# Patient Record
Sex: FEMALE | Race: BLACK OR AFRICAN AMERICAN | URBAN - METROPOLITAN AREA
[De-identification: names, ages, dates, MRNs, and addresses within clinical notes are randomized per-mention and may not be internally consistent; named-entity substitution may affect disease eponyms.]

---

## 2017-04-28 ENCOUNTER — OFFICE VISIT (OUTPATIENT)
Dept: FAMILY MEDICINE CLINIC | Age: 47
End: 2017-04-28

## 2017-04-28 VITALS
OXYGEN SATURATION: 98 % | HEIGHT: 68 IN | SYSTOLIC BLOOD PRESSURE: 118 MMHG | DIASTOLIC BLOOD PRESSURE: 82 MMHG | RESPIRATION RATE: 18 BRPM | BODY MASS INDEX: 27.13 KG/M2 | HEART RATE: 89 BPM | TEMPERATURE: 98 F | WEIGHT: 179 LBS

## 2017-04-28 DIAGNOSIS — R31.9 HEMATURIA: ICD-10-CM

## 2017-04-28 DIAGNOSIS — K59.09 CHRONIC CONSTIPATION: ICD-10-CM

## 2017-04-28 DIAGNOSIS — K43.9 VENTRAL HERNIA WITHOUT OBSTRUCTION OR GANGRENE: ICD-10-CM

## 2017-04-28 DIAGNOSIS — E55.9 VITAMIN D DEFICIENCY: ICD-10-CM

## 2017-04-28 DIAGNOSIS — E78.00 PURE HYPERCHOLESTEROLEMIA: Primary | ICD-10-CM

## 2017-04-28 LAB
25(OH)D3 SERPL-MCNC: 19.8 NG/ML (ref 32–100)
BILIRUB UR QL: NEGATIVE
CHOLEST SERPL-MCNC: 206 MG/DL (ref 110–200)
EPITHELIAL,EPSU: ABNORMAL /HPF (ref 0–2)
GLUCOSE UR QL: NEGATIVE MG/DL
HDLC SERPL-MCNC: 48 MG/DL (ref 40–59)
HGB UR QL STRIP: NEGATIVE
KETONES UR QL STRIP.AUTO: NEGATIVE MG/DL
LDLC SERPL CALC-MCNC: 140 MG/DL (ref 50–99)
LEUKOCYTE ESTERASE: ABNORMAL
NITRITE UR QL STRIP.AUTO: NEGATIVE
PH UR STRIP: 5 PH (ref 5–8)
PROT UR QL STRIP: NEGATIVE MG/DL
RBC #/AREA URNS HPF: ABNORMAL /HPF
SP GR UR: 1.01 (ref 1–1.03)
TRIGL SERPL-MCNC: 88 MG/DL (ref 40–149)
UROBILINOGEN UR STRIP-MCNC: <2 MG/DL
VLDLC SERPL CALC-MCNC: 18 MG/DL (ref 8–30)
WBC URNS QL MICRO: ABNORMAL /HPF (ref 0–2)

## 2017-04-28 RX ORDER — LUBIPROSTONE 8 UG/1
8 CAPSULE, GELATIN COATED ORAL 2 TIMES DAILY WITH MEALS
Qty: 60 CAP | Refills: 0 | Status: SHIPPED | OUTPATIENT
Start: 2017-04-28 | End: 2017-04-28 | Stop reason: SDUPTHER

## 2017-04-28 RX ORDER — LUBIPROSTONE 8 UG/1
8 CAPSULE, GELATIN COATED ORAL 2 TIMES DAILY WITH MEALS
Qty: 60 CAP | Refills: 0 | Status: SHIPPED | OUTPATIENT
Start: 2017-04-28

## 2017-04-28 NOTE — PROGRESS NOTES
Assessment/Plan:    *Manuela was seen today for abdominal pain. Diagnoses and all orders for this visit:    Pure hypercholesterolemia  -     LIPID PANEL; Future  -     LIPID PANEL    Vitamin D deficiency  -     VITAMIN D, 25 HYDROXY; Future  -     VITAMIN D, 25 HYDROXY    Ventral hernia without obstruction or gangrene    Hematuria    Chronic constipation  -     XR ABD FLAT/ ERECT; Future  -     lubiPROStone (AMITIZA) 8 mcg capsule; Take 1 Cap by mouth two (2) times daily (with meals). Other orders  -     Discontinue: lubiPROStone (AMITIZA) 8 mcg capsule; Take 1 Cap by mouth two (2) times daily (with meals). Pt will call if Sneha Salines is causing any issues. Or if helping but she wants to try the higher dose, she will call. Stop probiotic. She will monitor thr hernia for now. Will let me know if discomfort worsens. She is not eager to have any surgery at this time. Will contact pt on lab results. The plan was discussed with the patient. The patient verbalized understanding and is in agreement with the plan. All medication potential side effects were discussed with the patient.    -------------------------------------------------------------------------------------------------------------------        Zia Sadler is a 51 y.o. female and presents with Abdominal Pain         Subjective:  Pt here for f/u. HLD: elevated on last BW. She has been using the fish oil, trying to watch her diet. Has been exercising more regularly. Needs to be repeated. Vit D was low. Constipation still a major issue for her. Has tried probiotic but not helping much. Has already tried several other OTC meds in the past including fiber powder but has not done well with any of them. . Even tried Eddye Stare in the past but did not react well to this. ROS:  Constitutional: No recent weight change. No weakness/fatigue. No f/c. Skin: No rashes, change in nails/hair, itching   HENT: No HA, dizziness. No hearing loss/tinnitus. No nasal congestion/discharge. Eyes: No change in vision, double/blurred vision or eye pain/redness. Cardiovascular: No CP/palpitations. No ALMEIDA/orthopnea/PND. Respiratory: No cough/sputum, dyspnea, wheezing. Gastointestinal: No dysphagia, reflux. No n/v. No constipation/diarrhea. No melena/rectal bleeding. Genitourinary: No dysuria, urinary hesitancy, nocturia, hematuria. No incontinence. Musculoskeletal: No joint pain/stiffness. No muscle pain/tenderness. Endo: No heat/cold intolerance, no polyuria/polydypsia. Heme: No h/o anemia. No easy bleeding/bruising. Allergy/Immunology: No seasonal rhinitis. Denies frequent colds, sinus/ear infections. Neurological: No seizures/numbness/weakness. No paresthesias. Psychiatric:  No depression, anxiety. The problem list was updated as a part of today's visit. Patient Active Problem List   Diagnosis Code    Depressive disorder F32.9    Irritable colon K58.9    Vitamin D deficiency E55.9    Bipolar affective disorder ([facility] [address] F31.9    HLD (hyperlipidemia) E78.5       The PSH, FH were reviewed. SH:  Social History   Substance Use Topics    Smoking status: Never Smoker    Smokeless tobacco: Never Used    Alcohol use Yes      Comment: Occasionally       Medications/Allergies:  No current outpatient prescriptions on file prior to visit. No current facility-administered medications on file prior to visit. No Known Allergies      Health Maintenance:   Health Maintenance   Topic Date Due    PAP AKA CERVICAL CYTOLOGY  09/15/2016    DTaP/Tdap/Td series (2 - Td) 02/09/2020    INFLUENZA AGE 9 TO ADULT  Completed       Objective:  Visit Vitals    /82    Pulse 89    Temp 98 °F (36.7 °C)    Resp 18    Ht 5' 8\" (1.727 m)    Wt 179 lb (81.2 kg)    LMP 04/05/2017    SpO2 98%    BMI 27.22 kg/m2          Nurses notes and VS reviewed.       Physical Examination: General appearance - alert, well appearing, and in no distress  Chest - clear to auscultation, no wheezes, rales or rhonchi, symmetric air entry  Heart - normal rate, regular rhythm, normal S1, S2, no murmurs, rubs, clicks or gallops  Abdomen - tenderness noted in center of abd, just above the umbilicus, bulging when sitting up from laying down. Labwork and Ancillary Studies:    CBC w/Diff  Lab Results   Component Value Date/Time    WBC 5.7 12/23/2016 08:15 AM    HGB 12.8 12/23/2016 08:15 AM    PLATELET 554 20/96/1137 08:15 AM         Basic Metabolic Profile  Lab Results   Component Value Date/Time    Sodium 138 12/23/2016 08:15 AM    Potassium 4.5 12/23/2016 08:15 AM    Chloride 98 12/23/2016 08:15 AM    CO2 21 12/23/2016 08:15 AM    Anion gap 19.0 12/23/2016 08:15 AM    Glucose 96 12/23/2016 08:15 AM    BUN 12 12/23/2016 08:15 AM    Creatinine 0.8 12/23/2016 08:15 AM    Calcium 9.3 12/23/2016 08:15 AM         LFT  Lab Results   Component Value Date/Time    ALT (SGPT) 10 12/23/2016 08:15 AM    AST (SGOT) 14 12/23/2016 08:15 AM    Alk.  phosphatase 53 12/23/2016 08:15 AM    Bilirubin, direct <0.2 12/23/2016 08:15 AM    Bilirubin, total 0.3 12/23/2016 08:15 AM         Cholesterol  Lab Results   Component Value Date/Time    Cholesterol, total 238 12/23/2016 08:15 AM    HDL Cholesterol 53 12/23/2016 08:15 AM    LDL, calculated 166 12/23/2016 08:15 AM    Triglyceride 99 12/23/2016 08:15 AM

## 2017-04-28 NOTE — MR AVS SNAPSHOT
Visit Information Date & Time Provider Department Dept. Phone Encounter #  
 4/28/2017  8:45 AM Carl Noriega, [facility] [phone] [id number] Upcoming Health Maintenance Date Due  
 PAP AKA CERVICAL CYTOLOGY 9/15/2016 DTaP/Tdap/Td series (2 - Td) 2/9/2020 Allergies as of 4/28/2017  Review Complete On: 4/28/2017 By: Sandra Puentes LPN No Known Allergies Current Immunizations  Reviewed on 12/29/2016 Name Date Influenza Vaccine 10/11/2016 Tdap 2/9/2010 Not reviewed this visit You Were Diagnosed With   
  
 Codes Comments Pure hypercholesterolemia    -  Primary ICD-10-CM: E78.00 ICD-9-CM: 272.0 Vitamin D deficiency     ICD-10-CM: E55.9 ICD-9-CM: 268.9 Ventral hernia without obstruction or gangrene     ICD-10-CM: K43.9 ICD-9-CM: 553.20 Hematuria     ICD-10-CM: R31.9 ICD-9-CM: 599.70 Chronic constipation     ICD-10-CM: K59.09 
ICD-9-CM: 564.00 Vitals BP Pulse Temp Resp Height(growth percentile) Weight(growth percentile) 118/82 89 98 °F (36.7 °C) 18 5' 8\" (1.727 m) 179 lb (81.2 kg) LMP SpO2 BMI OB Status Smoking Status 04/05/2017 98% 27.22 kg/m2 Having regular periods Never Smoker Vitals History BMI and BSA Data Body Mass Index Body Surface Area  
 27.22 kg/m 2 1.97 m 2 Preferred Pharmacy Pharmacy Name Phone [phone] [phone] [phone] Your Updated Medication List  
  
   
This list is accurate as of: 4/28/17  9:24 AM.  Always use your most recent med list.  
  
  
  
  
 lubiPROStone 8 mcg capsule Commonly known as:  Hailey Gannon Take 1 Cap by mouth two (2) times daily (with meals). Prescriptions Sent to Pharmacy Refills  
 lubiPROStone (AMITIZA) 8 mcg capsule 0 Sig: Take 1 Cap by mouth two (2) times daily (with meals).   
 Class: Normal  
 [facility]: [facility] [address] [place], [place] [address], [address] [place] [address] #: [phone] Route: Oral  
  
To-Do List   
 04/28/2017 Lab:  LIPID PANEL   
  
 04/28/2017 Lab:  VITAMIN D, 25 HYDROXY   
  
 04/28/2017 Imaging:  XR ABD FLAT/ ERECT Patient Instructions Constipation: Care Instructions Your Care Instructions Constipation means that you have a hard time passing stools (bowel movements). People pass stools from 3 times a day to once every 3 days. What is normal for you may be different. Constipation may occur with pain in the rectum and cramping. The pain may get worse when you try to pass stools. Sometimes there are small amounts of bright red blood on toilet paper or the surface of stools. This is because of enlarged veins near the rectum (hemorrhoids). A few changes in your diet and lifestyle may help you avoid ongoing constipation. Your doctor may also prescribe medicine to help loosen your stool. Some medicines can cause constipation. These include pain medicines and antidepressants. Tell your doctor about all the medicines you take. Your doctor may want to make a medicine change to ease your symptoms. Follow-up care is a key part of your treatment and safety. Be sure to make and go to all appointments, and call your doctor if you are having problems. It's also a good idea to know your test results and keep a list of the medicines you take. How can you care for yourself at home? · Drink plenty of fluids, enough so that your urine is light yellow or clear like water. If you have kidney, heart, or liver disease and have to limit fluids, talk with your doctor before you increase the amount of fluids you drink. · Include high-fiber foods in your diet each day. These include fruits, vegetables, beans, and whole grains. · Get at least 30 minutes of exercise on most days of the week.  Walking is a good choice. You also may want to do other activities, such as running, swimming, cycling, or playing tennis or team sports. · Take a fiber supplement, such as Citrucel or Metamucil, every day. Read and follow all instructions on the label. · Schedule time each day for a bowel movement. A daily routine may help. Take your time having your bowel movement. · Support your feet with a small step stool when you sit on the toilet. This helps flex your hips and places your pelvis in a squatting position. · Your doctor may recommend an over-the-counter laxative to relieve your constipation. Examples are Milk of Magnesia and MiraLax. Read and follow all instructions on the label. Do not use laxatives on a long-term basis. When should you call for help? Call your doctor now or seek immediate medical care if: 
· You have new or worse belly pain. · You have new or worse nausea or vomiting. · You have blood in your stools. Watch closely for changes in your health, and be sure to contact your doctor if: 
· Your constipation is getting worse. · You do not get better as expected. Where can you learn more? Go to http://gregory-samantha.info/. Enter 21  in the search box to learn more about \"Constipation: Care Instructions. \" Current as of: May 27, 2016 Content Version: 11.2 © 9896-8176 [employer] [address]. Care instructions adapted under license by [employer] (which disclaims liability or warranty for this information). If you have questions about a medical condition or this instruction, always ask your healthcare professional. [place] [address] any warranty or liability for your use of this information. Introducing [facility]! Marcus Owens introduces eMindful patient portal. Now you can access parts of your medical record, email your doctor's office, and request medication refills online.    
 
1. In your internet browser, go to [email or web address] 2. Click on the First Time User? Click Here link in the Sign In box. You will see the New Member Sign Up page. 3. Enter your WhoWanna Access Code exactly as it appears below. You will not need to use this code after youve completed the sign-up process. If you do not sign up before the expiration date, you must request a new code. · WhoWanna Access Code: [email or web address] Expires: 7/27/2017  9:24 AM 
 
4. Enter the last four digits of your Social Security Number (xxxx) and Date of Birth (mm/dd/yyyy) as indicated and click Submit. You will be taken to the next sign-up page. 5. Create a WhoWanna ID. This will be your WhoWanna login ID and cannot be changed, so think of one that is secure and easy to remember. 6. Create a WhoWanna password. You can change your password at any time. 7. Enter your Password Reset Question and Answer. This can be used at a later time if you forget your password. 8. Enter your e-mail address. You will receive e-mail notification when new information is available in [address] 9. Click Sign Up. You can now view and download portions of your medical record. 10. Click the Download Summary menu link to download a portable copy of your medical information. If you have questions, please visit the Frequently Asked Questions section of the WhoWanna website. Remember, WhoWanna is NOT to be used for urgent needs. For medical emergencies, dial 911. Now available from your iPhone and Android! Please provide this summary of care documentation to your next provider. Your primary care clinician is listed as Arian 51. If you have any questions after today's visit, please call [phone].

## 2017-04-28 NOTE — PROGRESS NOTES
Cindy Wesley is a 51 y.o. female here today with complaints of stomach issues        1. Have you been to the ER, urgent care clinic since your last visit? Hospitalized since your last visit? No    2. Have you seen or consulted any other health care providers outside of the [address] [place] [address] since your last visit? Include any pap smears or colon screening.  No

## 2017-04-28 NOTE — PATIENT INSTRUCTIONS
Constipation: Care Instructions  Your Care Instructions  Constipation means that you have a hard time passing stools (bowel movements). People pass stools from 3 times a day to once every 3 days. What is normal for you may be different. Constipation may occur with pain in the rectum and cramping. The pain may get worse when you try to pass stools. Sometimes there are small amounts of bright red blood on toilet paper or the surface of stools. This is because of enlarged veins near the rectum (hemorrhoids). A few changes in your diet and lifestyle may help you avoid ongoing constipation. Your doctor may also prescribe medicine to help loosen your stool. Some medicines can cause constipation. These include pain medicines and antidepressants. Tell your doctor about all the medicines you take. Your doctor may want to make a medicine change to ease your symptoms. Follow-up care is a key part of your treatment and safety. Be sure to make and go to all appointments, and call your doctor if you are having problems. It's also a good idea to know your test results and keep a list of the medicines you take. How can you care for yourself at home? · Drink plenty of fluids, enough so that your urine is light yellow or clear like water. If you have kidney, heart, or liver disease and have to limit fluids, talk with your doctor before you increase the amount of fluids you drink. · Include high-fiber foods in your diet each day. These include fruits, vegetables, beans, and whole grains. · Get at least 30 minutes of exercise on most days of the week. Walking is a good choice. You also may want to do other activities, such as running, swimming, cycling, or playing tennis or team sports. · Take a fiber supplement, such as Citrucel or Metamucil, every day. Read and follow all instructions on the label. · Schedule time each day for a bowel movement. A daily routine may help.  Take your time having your bowel movement. · Support your feet with a small step stool when you sit on the toilet. This helps flex your hips and places your pelvis in a squatting position. · Your doctor may recommend an over-the-counter laxative to relieve your constipation. Examples are Milk of Magnesia and MiraLax. Read and follow all instructions on the label. Do not use laxatives on a long-term basis. When should you call for help? Call your doctor now or seek immediate medical care if:  · You have new or worse belly pain. · You have new or worse nausea or vomiting. · You have blood in your stools. Watch closely for changes in your health, and be sure to contact your doctor if:  · Your constipation is getting worse. · You do not get better as expected. Where can you learn more? Go to http://gregory-samantha.info/. Enter 21 [phone] in the search box to learn more about \"Constipation: Care Instructions. \"  Current as of: May 27, 2016  Content Version: 11.2  © 0751-4411 [employer]. Care instructions adapted under license by [employer] (which disclaims liability or warranty for this information). If you have questions about a medical condition or this instruction, always ask your healthcare professional. [place] [address] any warranty or liability for your use of this information.

## 2017-05-01 ENCOUNTER — TELEPHONE (OUTPATIENT)
Dept: FAMILY MEDICINE CLINIC | Age: 47
End: 2017-05-01

## 2017-05-01 NOTE — PROGRESS NOTES
Urine is fine. Cholesterol has improved. We can watch this for now. Keep working on diet and exercise. Vit D low. Start Rx Vit D 50,000 units q week x 4 mon then start OTC 1000 units daily.

## 2017-05-01 NOTE — TELEPHONE ENCOUNTER
Pt is requesting a phone call from a nurse when possible. She states she did not want to tell me the reason for her call. Please contact when possible.

## 2017-05-02 DIAGNOSIS — R10.11 RIGHT UPPER QUADRANT ABDOMINAL PAIN: Primary | ICD-10-CM

## 2017-05-02 RX ORDER — ERGOCALCIFEROL 1.25 MG/1
50000 CAPSULE ORAL
Qty: 16 CAP | Refills: 0 | Status: SHIPPED | OUTPATIENT
Start: 2017-05-02 | End: 2017-08-16

## 2017-05-05 ENCOUNTER — TELEPHONE (OUTPATIENT)
Dept: FAMILY MEDICINE CLINIC | Age: 47
End: 2017-05-05

## 2017-05-05 NOTE — TELEPHONE ENCOUNTER
Pt called stating she would like to speak with Dr. Sakina Ashford about her hernia.  Pt does not want an appt    Please advise

## 2017-05-08 ENCOUNTER — TELEPHONE (OUTPATIENT)
Dept: FAMILY MEDICINE CLINIC | Age: 47
End: 2017-05-08

## 2017-05-08 NOTE — TELEPHONE ENCOUNTER
Patient is calling regarding her issue with hernia. She explained that she already talked to Dr. Sakina Ashford regarding this issue last week and would like to know what she should do. Patient added that she just want to know what to do if she decided to do the surgery. Asking to be called back by Dr. Calvin Valencia nurse.

## 2017-06-01 ENCOUNTER — TELEPHONE (OUTPATIENT)
Dept: FAMILY MEDICINE CLINIC | Age: 47
End: 2017-06-01

## 2017-06-01 NOTE — TELEPHONE ENCOUNTER
Pt called stating she changed her diet and wants Dr. Tin Choi to know    Pt reports she is now eating only green vegetables, chicken, and fish. She is no longer experiencing bloating, and is feeling better but is concerned whether or not she will be getting all the vitamins she needs.     Please advise

## 2017-06-02 ENCOUNTER — TELEPHONE (OUTPATIENT)
Dept: SURGERY | Age: 47
End: 2017-06-02

## 2017-06-02 NOTE — TELEPHONE ENCOUNTER
Left message for Ms. Marlene Briones to contact our office to reschedule her appointment with Dr. Cummings Spearsarthak for an evaluation of a hernia per Dr. Mayte Arceo referral.

## 2017-06-02 NOTE — TELEPHONE ENCOUNTER
Tell her she will do very well with this. She will get all the essential vitamins and nutrients she needs so keep up the good work!

## 2017-06-21 ENCOUNTER — TELEPHONE (OUTPATIENT)
Dept: FAMILY MEDICINE CLINIC | Age: 47
End: 2017-06-21

## 2017-06-21 NOTE — TELEPHONE ENCOUNTER
Pt called because she is being referred to a surgeon on [facility] in [place] for her hernia. But she does not want to drive that far for a surgeon. She is requesting someone closer to her if at all possible.  Please advise and call her back